# Patient Record
Sex: FEMALE | Race: BLACK OR AFRICAN AMERICAN | Employment: OTHER | URBAN - METROPOLITAN AREA
[De-identification: names, ages, dates, MRNs, and addresses within clinical notes are randomized per-mention and may not be internally consistent; named-entity substitution may affect disease eponyms.]

---

## 2017-01-23 DIAGNOSIS — E11.49 TYPE II DIABETES MELLITUS WITH NEUROLOGICAL MANIFESTATIONS (HCC): ICD-10-CM

## 2017-01-23 RX ORDER — PANTOPRAZOLE SODIUM 40 MG/1
TABLET, DELAYED RELEASE ORAL
Qty: 30 TAB | Refills: 0 | Status: SHIPPED | OUTPATIENT
Start: 2017-01-23 | End: 2017-04-03 | Stop reason: SDUPTHER

## 2017-01-23 RX ORDER — LANCETS
EACH MISCELLANEOUS
Qty: 100 EACH | Refills: 0 | Status: SHIPPED | OUTPATIENT
Start: 2017-01-23 | End: 2017-02-09 | Stop reason: SDUPTHER

## 2017-01-24 ENCOUNTER — TELEPHONE (OUTPATIENT)
Dept: FAMILY MEDICINE CLINIC | Age: 82
End: 2017-01-24

## 2017-01-24 NOTE — TELEPHONE ENCOUNTER
Fax received from [facility] requesting rx \"OneTouch Delica lancets 85W mis,\" directions use one lancet twice daily to test blood sugar. Not seen on med list. Please order.

## 2017-01-27 RX ORDER — PANTOPRAZOLE SODIUM 40 MG/1
TABLET, DELAYED RELEASE ORAL
Qty: 30 TAB | Refills: 0 | Status: SHIPPED | OUTPATIENT
Start: 2017-01-27 | End: 2017-04-03 | Stop reason: SDUPTHER

## 2017-01-30 ENCOUNTER — TELEPHONE (OUTPATIENT)
Dept: FAMILY MEDICINE CLINIC | Age: 82
End: 2017-01-30

## 2017-02-03 NOTE — TELEPHONE ENCOUNTER
Clarification: please re-send meds from 1/27/17 marked other. Pt uses One Touch Ultra and prescriptions need dx codes on script please. Thank you.

## 2017-02-03 NOTE — TELEPHONE ENCOUNTER
[facility] called: they need Dx code for both rxn labeled other  For 1/27/17 (BD single use swab) and pt is using one-touch ultra not True Metrix; please advise  Tel: [phone]  Fax: [phone]

## 2017-02-06 NOTE — TELEPHONE ENCOUNTER
Meds printed and faxed to [facility] @ [phone]. Copy submitted for scanning.

## 2017-02-09 DIAGNOSIS — E11.49 TYPE II DIABETES MELLITUS WITH NEUROLOGICAL MANIFESTATIONS (HCC): ICD-10-CM

## 2017-02-10 ENCOUNTER — TELEPHONE (OUTPATIENT)
Dept: FAMILY MEDICINE CLINIC | Age: 82
End: 2017-02-10

## 2017-02-10 RX ORDER — LANCETS
EACH MISCELLANEOUS
Qty: 100 EACH | Refills: 0 | Status: SHIPPED | OUTPATIENT
Start: 2017-02-10

## 2017-02-28 RX ORDER — ERGOCALCIFEROL 1.25 MG/1
CAPSULE ORAL
Qty: 12 CAP | Refills: 0 | OUTPATIENT
Start: 2017-02-28

## 2017-03-02 RX ORDER — ERGOCALCIFEROL 1.25 MG/1
50000 CAPSULE ORAL
Qty: 12 CAP | Refills: 0 | OUTPATIENT
Start: 2017-03-02

## 2017-03-21 RX ORDER — ERGOCALCIFEROL 1.25 MG/1
CAPSULE ORAL
Qty: 12 CAP | Refills: 0 | OUTPATIENT
Start: 2017-03-21

## 2017-03-23 NOTE — TELEPHONE ENCOUNTER
Pharmacy called during lunch wanting a refill for her vitamin  D2 medication. The nurse were at lunch so I had to take a message. I see Dr Donovan Estrella refused previous requests for medication. What was the reason?

## 2017-03-30 DIAGNOSIS — E11.40 TYPE 2 DIABETES MELLITUS WITH DIABETIC NEUROPATHY (HCC): ICD-10-CM

## 2017-03-31 RX ORDER — METFORMIN HYDROCHLORIDE 500 MG/1
TABLET, EXTENDED RELEASE ORAL
Qty: 60 TAB | Refills: 0 | Status: SHIPPED | OUTPATIENT
Start: 2017-03-31 | End: 2017-05-18 | Stop reason: SDUPTHER

## 2017-04-03 RX ORDER — BLOOD-GLUCOSE METER
1 EACH MISCELLANEOUS 2 TIMES DAILY
Qty: 1 EACH | Refills: 0 | Status: SHIPPED | OUTPATIENT
Start: 2017-04-03 | End: 2018-02-06 | Stop reason: SDUPTHER

## 2017-04-03 RX ORDER — LANCETS
EACH MISCELLANEOUS
Qty: 100 EACH | Refills: 11 | Status: SHIPPED | OUTPATIENT
Start: 2017-04-03 | End: 2017-05-18 | Stop reason: SDUPTHER

## 2017-04-03 RX ORDER — PANTOPRAZOLE SODIUM 40 MG/1
TABLET, DELAYED RELEASE ORAL
Qty: 90 TAB | Refills: 0 | Status: SHIPPED | OUTPATIENT
Start: 2017-04-03 | End: 2017-07-20 | Stop reason: SDUPTHER

## 2017-05-04 ENCOUNTER — TELEPHONE (OUTPATIENT)
Dept: FAMILY MEDICINE CLINIC | Age: 82
End: 2017-05-04

## 2017-05-04 DIAGNOSIS — E78.00 PURE HYPERCHOLESTEROLEMIA: ICD-10-CM

## 2017-05-04 NOTE — TELEPHONE ENCOUNTER
[address] called on the pt's behalf stating the pt does not like the extended release Metformin. They are requesting her prescription be changed to immediate release or liquid.  Please advise

## 2017-05-05 RX ORDER — PRAVASTATIN SODIUM 40 MG/1
40 TABLET ORAL
Qty: 90 TAB | Refills: 1 | OUTPATIENT
Start: 2017-05-05

## 2017-05-18 ENCOUNTER — OFFICE VISIT (OUTPATIENT)
Dept: FAMILY MEDICINE CLINIC | Age: 82
End: 2017-05-18

## 2017-05-18 ENCOUNTER — HOSPITAL ENCOUNTER (OUTPATIENT)
Dept: LAB | Age: 82
Discharge: HOME | End: 2017-05-18

## 2017-05-18 VITALS
HEART RATE: 68 BPM | WEIGHT: 176.2 LBS | HEIGHT: 63 IN | OXYGEN SATURATION: 95 % | TEMPERATURE: 97.7 F | RESPIRATION RATE: 18 BRPM | SYSTOLIC BLOOD PRESSURE: 132 MMHG | BODY MASS INDEX: 31.22 KG/M2 | DIASTOLIC BLOOD PRESSURE: 65 MMHG

## 2017-05-18 DIAGNOSIS — E11.49 TYPE II DIABETES MELLITUS WITH NEUROLOGICAL MANIFESTATIONS (HCC): Primary | ICD-10-CM

## 2017-05-18 DIAGNOSIS — E78.5 OTHER AND UNSPECIFIED HYPERLIPIDEMIA: ICD-10-CM

## 2017-05-18 DIAGNOSIS — M17.12 PRIMARY OSTEOARTHRITIS OF LEFT KNEE: ICD-10-CM

## 2017-05-18 DIAGNOSIS — G62.9 NEUROPATHY: ICD-10-CM

## 2017-05-18 DIAGNOSIS — I10 ESSENTIAL HYPERTENSION: ICD-10-CM

## 2017-05-18 LAB
GLUCOSE POC: 187 MG/DL
HBA1C MFR BLD HPLC: 9 %

## 2017-05-18 PROCEDURE — 99001 SPECIMEN HANDLING PT-LAB: CPT | Performed by: INTERNAL MEDICINE

## 2017-05-18 RX ORDER — LOSARTAN POTASSIUM 100 MG/1
100 TABLET ORAL DAILY
Qty: 90 TAB | Refills: 1 | Status: SHIPPED | OUTPATIENT
Start: 2017-05-18 | End: 2017-10-16 | Stop reason: SDUPTHER

## 2017-05-18 RX ORDER — LANCETS
EACH MISCELLANEOUS
Qty: 100 EACH | Refills: 11 | Status: SHIPPED | OUTPATIENT
Start: 2017-05-18 | End: 2017-06-22 | Stop reason: SDUPTHER

## 2017-05-18 RX ORDER — GABAPENTIN 100 MG/1
200 CAPSULE ORAL
Qty: 180 CAP | Refills: 3 | Status: SHIPPED | OUTPATIENT
Start: 2017-05-18 | End: 2018-05-29 | Stop reason: SDUPTHER

## 2017-05-18 RX ORDER — HYDROCHLOROTHIAZIDE 25 MG/1
25 TABLET ORAL DAILY
Qty: 90 TAB | Refills: 1 | Status: SHIPPED | OUTPATIENT
Start: 2017-05-18 | End: 2018-01-12 | Stop reason: SDUPTHER

## 2017-05-18 RX ORDER — PRAVASTATIN SODIUM 40 MG/1
40 TABLET ORAL
Qty: 90 TAB | Refills: 1 | Status: SHIPPED | OUTPATIENT
Start: 2017-05-18 | End: 2017-10-16 | Stop reason: SDUPTHER

## 2017-05-18 RX ORDER — PIOGLITAZONEHYDROCHLORIDE 45 MG/1
TABLET ORAL
Qty: 90 TAB | Refills: 1 | Status: SHIPPED | OUTPATIENT
Start: 2017-05-18 | End: 2017-10-16 | Stop reason: SDUPTHER

## 2017-05-18 RX ORDER — METFORMIN HYDROCHLORIDE 500 MG/1
1000 TABLET, EXTENDED RELEASE ORAL 2 TIMES DAILY WITH MEALS
Qty: 120 TAB | Refills: 3 | Status: SHIPPED | OUTPATIENT
Start: 2017-05-18 | End: 2017-10-16 | Stop reason: SDUPTHER

## 2017-05-18 NOTE — PROGRESS NOTES
HISTORY OF PRESENT ILLNESS  Daniela John is a 80 y.o. female. 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 Respiratory: Negative for shortness of breath. Cardiovascular: Negative for chest pain, palpitations and leg swelling. Gastrointestinal: Negative for nausea. Musculoskeletal: Negative for falls and myalgias. Neurological: Negative for dizziness and headaches. Physical Exam   Constitutional: She is oriented to person, place, and time. She appears well-developed and well-nourished. Neck: Neck supple. No thyromegaly present. Cardiovascular: Normal rate, regular rhythm and normal heart sounds. No murmur heard. Pulmonary/Chest: Effort normal and breath sounds normal. She has no rales. Abdominal: Soft. Bowel sounds are normal. There is no tenderness. Musculoskeletal: She exhibits no edema. Left knee: She exhibits normal range of motion, no swelling, no ecchymosis, no erythema and normal meniscus. Tenderness (diffuse, crepitus with ROM) found. Neurological: She is alert and oriented to person, place, and time. Gait (use cane) abnormal.   Skin:   Feet: decreased sensation to microfilament, no rash, no calluses, no ulcers     Vitals reviewed. ASSESSMENT and PLAN  Jennifer Armando was seen today for hypertension, cholesterol problem and diabetes.     Diagnoses and all orders for this visit:    Type II diabetes mellitus with neurological manifestations ([address] restart metformin, increase lantus to 20 units, call in a week if her fasting glucose more than 130  -     AMB POC HEMOGLOBIN A1C  -     AMB POC GLUCOSE BLOOD, BY GLUCOSE MONITORING DEVICE  -     pioglitazone (ACTOS) 45 mg tablet; TAKE ONE TABLET BY MOUTH EVERY DAY  -     metFORMIN ER (GLUCOPHAGE XR) 500 mg tablet; Take 2 Tabs by mouth two (2) times daily (with meals). -     glucose blood VI test strips (ACCU-CHEK GUILLAUME PLUS TEST STRP) strip; Check BS 3 daily Dx. E11.49  -     Lancets (ACCU-CHEK SOFTCLIX LANCETS) misc; Check BS 3 daily Dx. E11.49  -     MICROALBUMIN, UR, RAND W/ MICROALBUMIN/CREA RATIO; Future  -     CBC WITH AUTOMATED DIFF; Future  -     LIPID PANEL; Future  -     METABOLIC PANEL, COMPREHENSIVE; Future  -     URINALYSIS W/ RFLX MICROSCOPIC; Future    Essential hypertension, stable  -     hydroCHLOROthiazide (HYDRODIURIL) 25 mg tablet; Take 1 Tab by mouth daily. -     losartan (COZAAR) 100 mg tablet; Take 1 Tab by mouth daily.  -     CBC WITH AUTOMATED DIFF; Future  -     LIPID PANEL; Future  -     METABOLIC PANEL, COMPREHENSIVE; Future  -     URINALYSIS W/ RFLX MICROSCOPIC; Future    Other and unspecified hyperlipidemia, stable  -     pravastatin (PRAVACHOL) 40 mg tablet; Take 1 Tab by mouth nightly. -     CBC WITH AUTOMATED DIFF; Future  -     LIPID PANEL; Future  -     METABOLIC PANEL, COMPREHENSIVE; Future    Neuropathy, stable  -     gabapentin (NEURONTIN) 100 mg capsule; Take 2 Caps by mouth three (3) times daily as needed. Indications: NEUROPATHIC PAIN    Primary osteoarthritis of left knee, discussed options, she wanted steroid injection  -     METHYLPREDNISOLONE ACETATE INJECTION 80 MG  -     DRAIN/INJECT LARGE JOINT/BURSA    After obtaining written consent, using sterile fashion, the left knee was injected with 80 mg depomedrol mixed with 2 cc lidocaine . Pt tolerated the procedure well. After care instructions given.  Pain level improved to 2/10 after the injection    [facility]  OFFICE PROCEDURE PROGRESS NOTE        Chart reviewed for the following:   Iris RUTHEROFRD, MD, have reviewed the History, Physical and updated the Allergic reactions for [id number] performed immediately prior to start of procedure:   Neo Salamanca MD, have performed the following reviews on [place] Wick prior to the start of the procedure:            * Patient was identified by name and date of birth   * Agreement on procedure being performed was verified  * Risks and Benefits explained to the patient  * Procedure site verified and marked as necessary  * Patient was positioned for comfort  * Consent was signed and verified     Date of procedure:5/18/2017   Time of procedure: 11:21 am  Procedure performed by:  Jadiel Segura MD    Provider assisted by: Pattie Amaya    Patient assisted by: self    Pt tolerated the procedure well with no complications      Pt was advised to contact our office immediately if redness/pain or fever develops, Pt verbalized understanding      Results for orders placed or performed in visit on 05/18/17   AMB POC HEMOGLOBIN A1C   Result Value Ref Range    Hemoglobin A1c (POC) 9.0 %   AMB POC GLUCOSE BLOOD, BY GLUCOSE MONITORING DEVICE   Result Value Ref Range    Glucose  mg/dL     rtc 3 mos, sooner if needed

## 2017-05-18 NOTE — PROGRESS NOTES
1. Have you been to the ER, urgent care clinic since your last visit? Hospitalized since your last visit? No    2. Have you seen or consulted any other health care providers outside of the [address] [place] [address] since your last visit? Include any pap smears or colon screening.  Yes When: 3/2017 Dr. Mona Mays

## 2017-05-19 LAB
ALBUMIN SERPL-MCNC: 4.1 G/DL (ref 3.5–4.7)
ALBUMIN/CREAT UR: 18.1 MG/G CREAT (ref 0–30)
ALBUMIN/GLOB SERPL: 1.3 {RATIO} (ref 1.2–2.2)
ALP SERPL-CCNC: 78 IU/L (ref 39–117)
ALT SERPL-CCNC: 22 IU/L (ref 0–32)
APPEARANCE UR: ABNORMAL
AST SERPL-CCNC: 22 IU/L (ref 0–40)
BACTERIA #/AREA URNS HPF: ABNORMAL /[HPF]
BASOPHILS # BLD AUTO: 0 X10E3/UL (ref 0–0.2)
BASOPHILS NFR BLD AUTO: 0 %
BILIRUB SERPL-MCNC: 0.5 MG/DL (ref 0–1.2)
BILIRUB UR QL STRIP: NEGATIVE
BUN SERPL-MCNC: 22 MG/DL (ref 8–27)
BUN/CREAT SERPL: 24 (ref 12–28)
CALCIUM SERPL-MCNC: 9.6 MG/DL (ref 8.7–10.3)
CASTS URNS QL MICRO: ABNORMAL /LPF
CHLORIDE SERPL-SCNC: 95 MMOL/L (ref 96–106)
CHOLEST SERPL-MCNC: 165 MG/DL (ref 100–199)
CO2 SERPL-SCNC: 26 MMOL/L (ref 18–29)
COLOR UR: YELLOW
CREAT SERPL-MCNC: 0.92 MG/DL (ref 0.57–1)
CREAT UR-MCNC: 107.4 MG/DL
EOSINOPHIL # BLD AUTO: 0 X10E3/UL (ref 0–0.4)
EOSINOPHIL NFR BLD AUTO: 0 %
EPI CELLS #/AREA URNS HPF: ABNORMAL /HPF
ERYTHROCYTE [DISTWIDTH] IN BLOOD BY AUTOMATED COUNT: 13.5 % (ref 12.3–15.4)
GLOBULIN SER CALC-MCNC: 3.2 G/DL (ref 1.5–4.5)
GLUCOSE SERPL-MCNC: 175 MG/DL (ref 65–99)
GLUCOSE UR QL: NEGATIVE
HCT VFR BLD AUTO: 42.3 % (ref 34–46.6)
HDLC SERPL-MCNC: 46 MG/DL
HGB BLD-MCNC: 13.4 G/DL (ref 11.1–15.9)
HGB UR QL STRIP: ABNORMAL
IMM GRANULOCYTES # BLD: 0 X10E3/UL (ref 0–0.1)
IMM GRANULOCYTES NFR BLD: 0 %
INTERPRETATION, 910389: NORMAL
INTERPRETATION: NORMAL
KETONES UR QL STRIP: NEGATIVE
LDLC SERPL CALC-MCNC: 99 MG/DL (ref 0–99)
LEUKOCYTE ESTERASE UR QL STRIP: NEGATIVE
LYMPHOCYTES # BLD AUTO: 1.2 X10E3/UL (ref 0.7–3.1)
LYMPHOCYTES NFR BLD AUTO: 24 %
MCH RBC QN AUTO: 26.8 PG (ref 26.6–33)
MCHC RBC AUTO-ENTMCNC: 31.7 G/DL (ref 31.5–35.7)
MCV RBC AUTO: 85 FL (ref 79–97)
MICRO URNS: ABNORMAL
MICROALBUMIN UR-MCNC: 19.4 UG/ML
MONOCYTES # BLD AUTO: 0.6 X10E3/UL (ref 0.1–0.9)
MONOCYTES NFR BLD AUTO: 12 %
MUCOUS THREADS URNS QL MICRO: PRESENT
NEUTROPHILS # BLD AUTO: 3.1 X10E3/UL (ref 1.4–7)
NEUTROPHILS NFR BLD AUTO: 64 %
NITRITE UR QL STRIP: POSITIVE
PDF IMAGE, 910387: NORMAL
PH UR STRIP: 5 [PH] (ref 5–7.5)
PLATELET # BLD AUTO: 209 X10E3/UL (ref 150–379)
POTASSIUM SERPL-SCNC: 3.8 MMOL/L (ref 3.5–5.2)
PROT SERPL-MCNC: 7.3 G/DL (ref 6–8.5)
PROT UR QL STRIP: NEGATIVE
RBC # BLD AUTO: 5 X10E6/UL (ref 3.77–5.28)
RBC #/AREA URNS HPF: ABNORMAL /HPF
SODIUM SERPL-SCNC: 140 MMOL/L (ref 134–144)
SP GR UR: 1.02 (ref 1–1.03)
TRIGL SERPL-MCNC: 99 MG/DL (ref 0–149)
UROBILINOGEN UR STRIP-MCNC: 0.2 MG/DL (ref 0.2–1)
VLDLC SERPL CALC-MCNC: 20 MG/DL (ref 5–40)
WBC # BLD AUTO: 4.8 X10E3/UL (ref 3.4–10.8)
WBC #/AREA URNS HPF: ABNORMAL /HPF

## 2017-05-22 RX ORDER — SULFAMETHOXAZOLE AND TRIMETHOPRIM 800; 160 MG/1; MG/1
1 TABLET ORAL 2 TIMES DAILY
Qty: 14 TAB | Refills: 0 | Status: SHIPPED | OUTPATIENT
Start: 2017-05-22 | End: 2017-05-29

## 2017-05-22 NOTE — PROGRESS NOTES
Lipids are good  Glucose 175, similar to her glucose reading in the office    UA showed positive UTI, Abx called to her pharmacy, need to follow up next week to re check

## 2017-06-22 DIAGNOSIS — E11.49 TYPE II DIABETES MELLITUS WITH NEUROLOGICAL MANIFESTATIONS (HCC): ICD-10-CM

## 2017-06-22 RX ORDER — LANCETS
EACH MISCELLANEOUS
Qty: 100 EACH | Refills: 11 | Status: SHIPPED | OUTPATIENT
Start: 2017-06-22 | End: 2019-02-19 | Stop reason: SDUPTHER

## 2017-06-23 ENCOUNTER — TELEPHONE (OUTPATIENT)
Dept: FAMILY MEDICINE CLINIC | Age: 82
End: 2017-06-23

## 2017-06-23 NOTE — TELEPHONE ENCOUNTER
[address] called in regards to the pt, her test strips are coming up too soon and it's because the current script says to check 3 times a day and the pt checks it 5 times a day. They need a new script to say 5 times a day instead of 3 times a day. Please make sure the diagnoses code is attached.

## 2017-06-26 NOTE — TELEPHONE ENCOUNTER
No need to check more that 3 x daily, she need to pay for the extra strips if she wants to monitor 5xdaily

## 2017-07-14 DIAGNOSIS — E11.49 TYPE II DIABETES MELLITUS WITH NEUROLOGICAL MANIFESTATIONS (HCC): ICD-10-CM

## 2017-07-14 RX ORDER — INSULIN GLARGINE 100 [IU]/ML
INJECTION, SOLUTION SUBCUTANEOUS
Qty: 15 PEN | Refills: 0 | Status: SHIPPED | OUTPATIENT
Start: 2017-07-14 | End: 2018-01-12 | Stop reason: SDUPTHER

## 2017-07-14 NOTE — TELEPHONE ENCOUNTER
Pt needing test strips. Trying to request enough to test 5 times daily as the pt does additional testing unpromptedly. States they need it today, please expedite if possible.

## 2017-07-21 RX ORDER — PANTOPRAZOLE SODIUM 40 MG/1
TABLET, DELAYED RELEASE ORAL
Qty: 90 TAB | Refills: 1 | Status: SHIPPED | OUTPATIENT
Start: 2017-07-21 | End: 2018-02-01 | Stop reason: SDUPTHER

## 2017-08-31 ENCOUNTER — CLINICAL SUPPORT (OUTPATIENT)
Dept: FAMILY MEDICINE CLINIC | Age: 82
End: 2017-08-31

## 2017-10-16 ENCOUNTER — HOSPITAL ENCOUNTER (OUTPATIENT)
Dept: LAB | Age: 82
Discharge: HOME | End: 2017-10-16
Payer: MEDICARE

## 2017-10-16 ENCOUNTER — TELEPHONE (OUTPATIENT)
Dept: FAMILY MEDICINE CLINIC | Age: 82
End: 2017-10-16

## 2017-10-16 ENCOUNTER — OFFICE VISIT (OUTPATIENT)
Dept: FAMILY MEDICINE CLINIC | Age: 82
End: 2017-10-16

## 2017-10-16 ENCOUNTER — HOSPITAL ENCOUNTER (OUTPATIENT)
Dept: LAB | Age: 82
Discharge: HOME | End: 2017-10-16

## 2017-10-16 VITALS
BODY MASS INDEX: 31.18 KG/M2 | SYSTOLIC BLOOD PRESSURE: 140 MMHG | OXYGEN SATURATION: 98 % | DIASTOLIC BLOOD PRESSURE: 70 MMHG | WEIGHT: 176 LBS | HEART RATE: 85 BPM | HEIGHT: 63 IN | TEMPERATURE: 98.8 F | RESPIRATION RATE: 20 BRPM

## 2017-10-16 DIAGNOSIS — E78.2 MIXED HYPERLIPIDEMIA: ICD-10-CM

## 2017-10-16 DIAGNOSIS — E11.49 TYPE II DIABETES MELLITUS WITH NEUROLOGICAL MANIFESTATIONS (HCC): ICD-10-CM

## 2017-10-16 DIAGNOSIS — I10 ESSENTIAL HYPERTENSION: ICD-10-CM

## 2017-10-16 DIAGNOSIS — E11.49 TYPE II DIABETES MELLITUS WITH NEUROLOGICAL MANIFESTATIONS (HCC): Primary | ICD-10-CM

## 2017-10-16 LAB
APPEARANCE UR: CLEAR
BACTERIA URNS QL MICRO: ABNORMAL /HPF
BILIRUB UR QL: NEGATIVE
COLOR UR: YELLOW
EPITH CASTS URNS QL MICRO: ABNORMAL /LPF (ref 0–5)
GLUCOSE UR STRIP.AUTO-MCNC: NEGATIVE MG/DL
HBA1C MFR BLD HPLC: 6.9 %
HGB UR QL STRIP: NEGATIVE
KETONES UR QL STRIP.AUTO: NEGATIVE MG/DL
LEUKOCYTE ESTERASE UR QL STRIP.AUTO: ABNORMAL
NITRITE UR QL STRIP.AUTO: NEGATIVE
PH UR STRIP: 6 [PH] (ref 5–8)
PROT UR STRIP-MCNC: NEGATIVE MG/DL
RBC #/AREA URNS HPF: NEGATIVE /HPF (ref 0–5)
SP GR UR REFRACTOMETRY: 1.01 (ref 1–1.03)
UROBILINOGEN UR QL STRIP.AUTO: 0.2 EU/DL (ref 0.2–1)
WBC URNS QL MICRO: ABNORMAL /HPF (ref 0–4)

## 2017-10-16 PROCEDURE — 99001 SPECIMEN HANDLING PT-LAB: CPT | Performed by: INTERNAL MEDICINE

## 2017-10-16 PROCEDURE — 81001 URINALYSIS AUTO W/SCOPE: CPT | Performed by: INTERNAL MEDICINE

## 2017-10-16 RX ORDER — PIOGLITAZONEHYDROCHLORIDE 45 MG/1
TABLET ORAL
Qty: 90 TAB | Refills: 1 | Status: SHIPPED | OUTPATIENT
Start: 2017-10-16 | End: 2018-09-25 | Stop reason: SDUPTHER

## 2017-10-16 RX ORDER — LOSARTAN POTASSIUM 100 MG/1
100 TABLET ORAL DAILY
Qty: 90 TAB | Refills: 1 | Status: SHIPPED | OUTPATIENT
Start: 2017-10-16 | End: 2018-07-17 | Stop reason: SDUPTHER

## 2017-10-16 RX ORDER — METFORMIN HYDROCHLORIDE 500 MG/1
1000 TABLET, EXTENDED RELEASE ORAL 2 TIMES DAILY WITH MEALS
Qty: 120 TAB | Refills: 3 | Status: SHIPPED | OUTPATIENT
Start: 2017-10-16 | End: 2018-07-17 | Stop reason: SDUPTHER

## 2017-10-16 RX ORDER — PRAVASTATIN SODIUM 40 MG/1
40 TABLET ORAL
Qty: 90 TAB | Refills: 1 | Status: SHIPPED | OUTPATIENT
Start: 2017-10-16 | End: 2018-09-25 | Stop reason: SDUPTHER

## 2017-10-16 NOTE — PROGRESS NOTES
HISTORY OF PRESENT ILLNESS  Tanner Garcia is a 80 y.o. female. HPI  DM, with neuropathy, stable, her a1c is 6.9 today, glucose  in am, well controlled, her neuropathy is controlled on neurontin prn  HTN, stable, bp is well controlled on meds daily  HLD, stable, tolerating pravachol well  Review of Systems   Constitutional: Negative for chills and fever. Respiratory: Negative for shortness of breath. Cardiovascular: Negative for chest pain, palpitations and leg swelling. Gastrointestinal: Negative for nausea. Musculoskeletal: Negative for falls and myalgias. Neurological: Negative for dizziness and headaches. Physical Exam   Constitutional: She is oriented to person, place, and time. She appears well-developed and well-nourished. Cardiovascular: Normal rate, regular rhythm and normal heart sounds. No murmur heard. Pulmonary/Chest: Effort normal and breath sounds normal. She has no rales. Abdominal: Soft. There is no tenderness. Musculoskeletal: She exhibits no edema. Neurological: She is alert and oriented to person, place, and time. Vitals reviewed. ASSESSMENT and PLAN  Diagnoses and all orders for this visit:    1. Type II diabetes mellitus with neurological manifestations ([facility] [address] stable, she will get her diabetic eye exam done this month  -     AMB POC HEMOGLOBIN A1C  -     pioglitazone (ACTOS) 45 mg tablet; TAKE ONE TABLET BY MOUTH EVERY DAY  -     metFORMIN ER (GLUCOPHAGE XR) 500 mg tablet; Take 2 Tabs by mouth two (2) times daily (with meals). -     CBC WITH AUTOMATED DIFF; Future  -     METABOLIC PANEL, COMPREHENSIVE; Future  -     URINALYSIS W/ RFLX MICROSCOPIC; Future    2. Essential hypertension, stable  -     losartan (COZAAR) 100 mg tablet; Take 1 Tab by mouth daily.  -     CBC WITH AUTOMATED DIFF; Future  -     METABOLIC PANEL, COMPREHENSIVE; Future    3. Mixed hyperlipidemia, stable  -     pravastatin (PRAVACHOL) 40 mg tablet; Take 1 Tab by mouth nightly.   - CBC WITH AUTOMATED DIFF; Future  -     METABOLIC PANEL, COMPREHENSIVE; Future    Results for orders placed or performed in visit on 10/16/17   AMB POC HEMOGLOBIN A1C   Result Value Ref Range    Hemoglobin A1c (POC) 6.9 %     Lab Results   Component Value Date/Time    Sodium 140 05/18/2017 11:27 AM    Potassium 3.8 05/18/2017 11:27 AM    Chloride 95 05/18/2017 11:27 AM    CO2 26 05/18/2017 11:27 AM    Anion gap 11 09/09/2010 10:12 AM    Glucose 175 05/18/2017 11:27 AM    BUN 22 05/18/2017 11:27 AM    Creatinine 0.92 05/18/2017 11:27 AM    BUN/Creatinine ratio 24 05/18/2017 11:27 AM    GFR est AA 65 05/18/2017 11:27 AM    GFR est non-AA 56 05/18/2017 11:27 AM    Calcium 9.6 05/18/2017 11:27 AM    Bilirubin, total 0.5 05/18/2017 11:27 AM    AST (SGOT) 22 05/18/2017 11:27 AM    Alk.  phosphatase 78 05/18/2017 11:27 AM    Protein, total 7.3 05/18/2017 11:27 AM    Albumin 4.1 05/18/2017 11:27 AM    Globulin 3.6 01/12/2010 09:14 AM    A-G Ratio 1.3 05/18/2017 11:27 AM    ALT (SGPT) 22 05/18/2017 11:27 AM     Lab Results   Component Value Date/Time    Cholesterol, total 165 05/18/2017 11:27 AM    HDL Cholesterol 46 05/18/2017 11:27 AM    LDL, calculated 99 05/18/2017 11:27 AM    VLDL, calculated 20 05/18/2017 11:27 AM    Triglyceride 99 05/18/2017 11:27 AM    CHOL/HDL Ratio 4.4 01/12/2010 09:14 AM   rtc 3-4 mos

## 2017-10-16 NOTE — TELEPHONE ENCOUNTER
Pt's daughter calling and requesting us to send A1C to ortho. She was unsure of who to send it to so she will call back with the information.

## 2017-10-16 NOTE — PROGRESS NOTES
Laly Fajardo is a 80 y.o. female (: 1929) presenting to address:    Chief Complaint   Patient presents with    Diabetes       Vitals:    10/16/17 1052   BP: 140/70   Pulse: 85   Resp: 20   Temp: 98.8 °F (37.1 °C)   TempSrc: Oral   SpO2: 98%   Weight: 176 lb (79.8 kg)   Height: 5' 3\" (1.6 m)   PainSc:   0 - No pain       Hearing/Vision:   No exam data present    Learning Assessment:     Learning Assessment 2014   PRIMARY LEARNER Patient   PRIMARY LANGUAGE ENGLISH   LEARNER PREFERENCE PRIMARY OTHER (COMMENT)   ANSWERED BY patient   RELATIONSHIP SELF     Depression Screening:     PHQ over the last two weeks 10/16/2017   Little interest or pleasure in doing things Not at all   Feeling down, depressed or hopeless Not at all   Total Score PHQ 2 0     Fall Risk Assessment:     Fall Risk Assessment, last 12 mths 10/16/2017   Able to walk? Yes   Fall in past 12 months? No   Fall with injury? -   Number of falls in past 12 months -     Abuse Screening:     Abuse Screening Questionnaire 10/16/2017   Do you ever feel afraid of your partner? N   Are you in a relationship with someone who physically or mentally threatens you? N   Is it safe for you to go home? Y     Coordination of Care Questionaire:   1. Have you been to the ER, urgent care clinic since your last visit? Hospitalized since your last visit? NO    2. Have you seen or consulted any other health care providers outside of the [address] [place] [address] since your last visit? Include any pap smears or colon screening. NO    Advanced Directive:   1. Do you have an Advanced Directive? NO    2. Would you like information on Advanced Directives?  Yes

## 2017-10-17 LAB
ALBUMIN SERPL-MCNC: 4.1 G/DL (ref 3.5–4.7)
ALBUMIN/GLOB SERPL: 1.3 {RATIO} (ref 1.2–2.2)
ALP SERPL-CCNC: 64 IU/L (ref 39–117)
ALT SERPL-CCNC: 15 IU/L (ref 0–32)
AST SERPL-CCNC: 23 IU/L (ref 0–40)
BASOPHILS # BLD AUTO: 0 X10E3/UL (ref 0–0.2)
BASOPHILS NFR BLD AUTO: 0 %
BILIRUB SERPL-MCNC: 0.4 MG/DL (ref 0–1.2)
BUN SERPL-MCNC: 22 MG/DL (ref 8–27)
BUN/CREAT SERPL: 23 (ref 12–28)
CALCIUM SERPL-MCNC: 9.8 MG/DL (ref 8.7–10.3)
CHLORIDE SERPL-SCNC: 96 MMOL/L (ref 96–106)
CO2 SERPL-SCNC: 28 MMOL/L (ref 18–29)
CREAT SERPL-MCNC: 0.94 MG/DL (ref 0.57–1)
EOSINOPHIL # BLD AUTO: 0 X10E3/UL (ref 0–0.4)
EOSINOPHIL NFR BLD AUTO: 1 %
ERYTHROCYTE [DISTWIDTH] IN BLOOD BY AUTOMATED COUNT: 13.8 % (ref 12.3–15.4)
GLOBULIN SER CALC-MCNC: 3.2 G/DL (ref 1.5–4.5)
GLUCOSE SERPL-MCNC: 91 MG/DL (ref 65–99)
HCT VFR BLD AUTO: 40.4 % (ref 34–46.6)
HGB BLD-MCNC: 12.4 G/DL (ref 11.1–15.9)
IMM GRANULOCYTES # BLD: 0 X10E3/UL (ref 0–0.1)
IMM GRANULOCYTES NFR BLD: 0 %
INTERPRETATION: NORMAL
LYMPHOCYTES # BLD AUTO: 1.1 X10E3/UL (ref 0.7–3.1)
LYMPHOCYTES NFR BLD AUTO: 24 %
MCH RBC QN AUTO: 26.7 PG (ref 26.6–33)
MCHC RBC AUTO-ENTMCNC: 30.7 G/DL (ref 31.5–35.7)
MCV RBC AUTO: 87 FL (ref 79–97)
MONOCYTES # BLD AUTO: 0.5 X10E3/UL (ref 0.1–0.9)
MONOCYTES NFR BLD AUTO: 11 %
NEUTROPHILS # BLD AUTO: 2.8 X10E3/UL (ref 1.4–7)
NEUTROPHILS NFR BLD AUTO: 64 %
PLATELET # BLD AUTO: 201 X10E3/UL (ref 150–379)
POTASSIUM SERPL-SCNC: 3.8 MMOL/L (ref 3.5–5.2)
PROT SERPL-MCNC: 7.3 G/DL (ref 6–8.5)
RBC # BLD AUTO: 4.64 X10E6/UL (ref 3.77–5.28)
SODIUM SERPL-SCNC: 140 MMOL/L (ref 134–144)
WBC # BLD AUTO: 4.4 X10E3/UL (ref 3.4–10.8)

## 2017-10-17 RX ORDER — NITROFURANTOIN 25; 75 MG/1; MG/1
100 CAPSULE ORAL 2 TIMES DAILY
Qty: 14 CAP | Refills: 0 | Status: SHIPPED | OUTPATIENT
Start: 2017-10-17 | End: 2018-01-12

## 2018-01-11 LAB — HBA1C MFR BLD HPLC: 7.5 %

## 2018-01-12 ENCOUNTER — OFFICE VISIT (OUTPATIENT)
Dept: FAMILY MEDICINE CLINIC | Age: 83
End: 2018-01-12

## 2018-01-12 VITALS
WEIGHT: 174.3 LBS | HEART RATE: 68 BPM | HEIGHT: 63 IN | TEMPERATURE: 95 F | DIASTOLIC BLOOD PRESSURE: 52 MMHG | OXYGEN SATURATION: 98 % | BODY MASS INDEX: 30.88 KG/M2 | SYSTOLIC BLOOD PRESSURE: 140 MMHG | RESPIRATION RATE: 20 BRPM

## 2018-01-12 DIAGNOSIS — E11.49 TYPE II DIABETES MELLITUS WITH NEUROLOGICAL MANIFESTATIONS (HCC): Primary | ICD-10-CM

## 2018-01-12 DIAGNOSIS — E78.2 MIXED HYPERLIPIDEMIA: ICD-10-CM

## 2018-01-12 DIAGNOSIS — I10 ESSENTIAL HYPERTENSION: ICD-10-CM

## 2018-01-12 RX ORDER — INSULIN GLARGINE 100 [IU]/ML
INJECTION, SOLUTION SUBCUTANEOUS
Qty: 15 PEN | Refills: 3 | Status: SHIPPED | OUTPATIENT
Start: 2018-01-12 | End: 2019-02-08 | Stop reason: SDUPTHER

## 2018-01-12 RX ORDER — HYDROCHLOROTHIAZIDE 25 MG/1
25 TABLET ORAL DAILY
Qty: 90 TAB | Refills: 1 | Status: SHIPPED | OUTPATIENT
Start: 2018-01-12 | End: 2018-11-09 | Stop reason: SDUPTHER

## 2018-01-12 RX ORDER — BLOOD SUGAR DIAGNOSTIC
1 STRIP MISCELLANEOUS DAILY
Qty: 100 PEN NEEDLE | Refills: 3 | Status: SHIPPED | OUTPATIENT
Start: 2018-01-12

## 2018-01-12 NOTE — PROGRESS NOTES
HISTORY OF PRESENT ILLNESS  Katelyn Tavera is a 80 y.o. female. HPI  DM with neuropathy, stable, her A1c was 7.5 yesterday, done at the hospital with her pre op labs, glucose  in am, no nhypoglycemia  HTN, stable, bp is controlled on meds daily  HLD, stable, last ldl was 99, on pravachol daily  For right wrist mass surgery next week  Review of Systems   Constitutional: Negative for chills and fever. Respiratory: Negative for shortness of breath. Cardiovascular: Negative for chest pain, palpitations and leg swelling. Gastrointestinal: Negative for nausea. Musculoskeletal: Negative for falls and myalgias. Neurological: Negative for dizziness and headaches. Physical Exam   Constitutional: She is oriented to person, place, and time. She appears well-developed and well-nourished. Cardiovascular: Normal rate, regular rhythm and normal heart sounds. No murmur heard. Pulmonary/Chest: Effort normal and breath sounds normal. She has no rales. Abdominal: Soft. There is no tenderness. Musculoskeletal: She exhibits no edema. Neurological: She is alert and oriented to person, place, and time. Vitals reviewed. ASSESSMENT and PLAN  Diagnoses and all orders for this visit:    1. Type II diabetes mellitus with neurological manifestations ([address] stable  -     insulin glargine (LANTUS SOLOSTAR) 100 unit/mL (3 mL) inpn; INJECT 18 UNITS SUBCUTANEOUSLY ONCE DAILY AT BEDTIME . EACH  PEN  EXPIRES  28  DAYS  AFTER  FIRST  USE.  -     Insulin Needles, Disposable, 32 gauge x 1/4\" ndle; 1 Each by Does Not Apply route daily. Use with Lantus solostar insulin pen once daily    2. Essential hypertension, stable  -     hydroCHLOROthiazide (HYDRODIURIL) 25 mg tablet; Take 1 Tab by mouth daily.     3. Mixed hyperlipidemia, stable    Continue meds  Stable for surgery, hold Actos and metformin on the morning of surgery, may take her cozaar/hctz on the morning of surgery    rtc 4 mos    Lab Results   Component Value Date/Time    Cholesterol, total 165 05/18/2017 11:27 AM    HDL Cholesterol 46 05/18/2017 11:27 AM    LDL, calculated 99 05/18/2017 11:27 AM    VLDL, calculated 20 05/18/2017 11:27 AM    Triglyceride 99 05/18/2017 11:27 AM    CHOL/HDL Ratio 4.4 01/12/2010 09:14 AM     Lab Results   Component Value Date/Time    Cholesterol, total 165 05/18/2017 11:27 AM    HDL Cholesterol 46 05/18/2017 11:27 AM    LDL, calculated 99 05/18/2017 11:27 AM    VLDL, calculated 20 05/18/2017 11:27 AM    Triglyceride 99 05/18/2017 11:27 AM    CHOL/HDL Ratio 4.4 01/12/2010 09:14 AM     Lab Results   Component Value Date/Time    Sodium 140 10/16/2017 11:33 AM    Potassium 3.8 10/16/2017 11:33 AM    Chloride 96 10/16/2017 11:33 AM    CO2 28 10/16/2017 11:33 AM    Anion gap 11 09/09/2010 10:12 AM    Glucose 91 10/16/2017 11:33 AM    BUN 22 10/16/2017 11:33 AM    Creatinine 0.94 10/16/2017 11:33 AM    BUN/Creatinine ratio 23 10/16/2017 11:33 AM    GFR est AA 63 10/16/2017 11:33 AM    GFR est non-AA 54 10/16/2017 11:33 AM    Calcium 9.8 10/16/2017 11:33 AM    Bilirubin, total 0.4 10/16/2017 11:33 AM    AST (SGOT) 23 10/16/2017 11:33 AM    Alk.  phosphatase 64 10/16/2017 11:33 AM    Protein, total 7.3 10/16/2017 11:33 AM    Albumin 4.1 10/16/2017 11:33 AM    Globulin 3.6 01/12/2010 09:14 AM    A-G Ratio 1.3 10/16/2017 11:33 AM    ALT (SGPT) 15 10/16/2017 11:33 AM

## 2018-01-12 NOTE — PROGRESS NOTES
Tanner Neri is a 80 y.o. female (: 1929) presenting to address:    Chief Complaint   Patient presents with    Pre-op Exam       Vitals:    18 1129   BP: 140/52   Pulse: 68   Resp: 20   Temp: 95 °F (35 °C)   TempSrc: Oral   SpO2: 98%   Weight: 174 lb 4.8 oz (79.1 kg)   Height: 5' 3\" (1.6 m)   PainSc:   0 - No pain       Hearing/Vision:   No exam data present    Learning Assessment:     Learning Assessment 2014   PRIMARY LEARNER Patient   PRIMARY LANGUAGE ENGLISH   LEARNER PREFERENCE PRIMARY OTHER (COMMENT)   ANSWERED BY patient   RELATIONSHIP SELF     Depression Screening:     PHQ over the last two weeks 2018   Little interest or pleasure in doing things Not at all   Feeling down, depressed or hopeless Not at all   Total Score PHQ 2 0     Fall Risk Assessment:     Fall Risk Assessment, last 12 mths 2018   Able to walk? Yes   Fall in past 12 months? Yes   Fall with injury? No   Number of falls in past 12 months 1   Fall Risk Score 1     Abuse Screening:     Abuse Screening Questionnaire 10/16/2017   Do you ever feel afraid of your partner? N   Are you in a relationship with someone who physically or mentally threatens you? N   Is it safe for you to go home? Y     Coordination of Care Questionaire:   1. Have you been to the ER, urgent care clinic since your last visit? Hospitalized since your last visit? YES [facility] er    2. Have you seen or consulted any other health care providers outside of the [address] [place] [address] since your last visit? Include any pap smears or colon screening. YES ortho    Advanced Directive:   1. Do you have an Advanced Directive? NO    2. Would you like information on Advanced Directives?  YES

## 2018-02-03 RX ORDER — PANTOPRAZOLE SODIUM 40 MG/1
TABLET, DELAYED RELEASE ORAL
Qty: 90 TAB | Refills: 1 | Status: SHIPPED | OUTPATIENT
Start: 2018-02-03

## 2018-02-06 DIAGNOSIS — E11.49 TYPE II DIABETES MELLITUS WITH NEUROLOGICAL MANIFESTATIONS (HCC): ICD-10-CM

## 2018-02-06 RX ORDER — BLOOD-GLUCOSE METER
1 EACH MISCELLANEOUS 2 TIMES DAILY
Qty: 1 EACH | Refills: 0 | Status: SHIPPED | OUTPATIENT
Start: 2018-02-06

## 2018-02-06 NOTE — TELEPHONE ENCOUNTER
Pt's machine broke and would like a replacement sent to the pharmacy. Has been unable to check today as machine broke.

## 2018-03-06 ENCOUNTER — TELEPHONE (OUTPATIENT)
Dept: FAMILY MEDICINE CLINIC | Age: 83
End: 2018-03-06

## 2018-03-06 NOTE — TELEPHONE ENCOUNTER
Which ER??, What diagnosis ??, I need ER notes, I do not see any info in care everywhere??, we might be able to  place a referral if we have the ER records/diagnosis
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
## 2018-03-07 NOTE — TELEPHONE ENCOUNTER
Attempted to contact pt but the voicemail is not the pts name, it says \"Ms Herbert\", also attempted to contact pts daughter but it was a wrong number. PSR: please update pts info/number when they call back.      Thank you
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
## 2018-03-16 ENCOUNTER — OFFICE VISIT (OUTPATIENT)
Dept: FAMILY MEDICINE CLINIC | Age: 83
End: 2018-03-16

## 2018-03-16 VITALS
WEIGHT: 170 LBS | RESPIRATION RATE: 20 BRPM | HEIGHT: 63 IN | BODY MASS INDEX: 30.12 KG/M2 | TEMPERATURE: 97.5 F | SYSTOLIC BLOOD PRESSURE: 140 MMHG | HEART RATE: 71 BPM | DIASTOLIC BLOOD PRESSURE: 72 MMHG | OXYGEN SATURATION: 100 %

## 2018-03-16 DIAGNOSIS — M54.50 ACUTE BILATERAL LOW BACK PAIN WITHOUT SCIATICA: ICD-10-CM

## 2018-03-16 DIAGNOSIS — M54.2 NECK PAIN: Primary | ICD-10-CM

## 2018-03-16 RX ORDER — TIZANIDINE 2 MG/1
2 TABLET ORAL
Qty: 30 TAB | Refills: 0 | Status: SHIPPED | OUTPATIENT
Start: 2018-03-16 | End: 2018-06-25

## 2018-03-16 RX ORDER — MELOXICAM 7.5 MG/1
7.5 TABLET ORAL
Qty: 30 TAB | Refills: 0 | Status: SHIPPED | OUTPATIENT
Start: 2018-03-16 | End: 2018-06-25
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
## 2018-03-19 ENCOUNTER — HOSPITAL ENCOUNTER (OUTPATIENT)
Dept: PHYSICAL THERAPY | Age: 83
Discharge: HOME | End: 2018-03-19
Payer: MEDICARE

## 2018-03-19 PROCEDURE — 97162 PT EVAL MOD COMPLEX 30 MIN: CPT

## 2018-03-19 PROCEDURE — G8978 MOBILITY CURRENT STATUS: HCPCS

## 2018-03-19 PROCEDURE — G8979 MOBILITY GOAL STATUS: HCPCS

## 2018-03-19 PROCEDURE — 97535 SELF CARE MNGMENT TRAINING: CPT
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
## 2018-03-19 NOTE — PROGRESS NOTES
PHYSICAL THERAPY - DAILY TREATMENT NOTE    Patient Name: Nandini Valencia        Date: 3/19/2018  : 1929   yes Patient  Verified  Visit #:     Insurance: Payor: Juan Juarez / Plan: [address] [place] [address] HMO / Product Type: Managed Care Medicare /      In time: 11:26 Out time: 12:05   Total Treatment Time: 39     Medicare Time Tracking (below)   Total Timed Codes (min):  15 1:1 Treatment Time:  39     TREATMENT AREA =  Back pain [M54.9]    SUBJECTIVE  Pain Level (on 0 to 10 scale):  8   10   Medication Changes/New allergies or changes in medical history, any new surgeries or procedures?    no  If yes, update Summary List   Subjective Functional Status/Changes:  []  No changes reported     See initial eval     Pt late to PT eval. Eval scheduled to start at 11:00 am, but did not start until 11:26am due to waiting on pt to complete paperwork. Pt requested her daughter come back with her for PT eval.     OBJECTIVE    15 min Patient Education:  yes  Reviewed HEP   []  Progressed/Changed HEP based on:  Pt instructed to amb with rollator at all times to improve safety and decrease risk of falls. Pt instructed on HEP to be completed daily and instructed to STOP if an increase in pain occurs or pain/symptoms go into leg. Pt educated on red flags and to seek immediate medical attention/911 if those occur. Pt educated on correct body mechanics with supine to sit transfers to protect spine. Reviewed POC and goals. Information reviewed with pt and pt's daughter who both report understanding.      Other Objective/Functional Measures:    See initial eval   Post Treatment Pain Level (on 0 to 10) scale:    10     ASSESSMENT  Assessment/Changes in Function:     See initial eval     []  See Progress Note/Recertification   Patient will continue to benefit from skilled PT services to see initial eval   Progress toward goals / Updated goals:    Pt denied sharp pains or red flags with initial eval or therapeutic ex. Pt reported an improvement in pain/sxs at end of session. See initial eval.     PLAN  [x]  Upgrade activities as tolerated yes Continue plan of care   []  Discharge due to :    [x]  Other: PT 1-2x/week for 4-6 weeks. Pt to schedule additional eval for neck.      Therapist: Glen Evangelista PT    Date: 3/19/2018 Time: 2:46 PM     Future Appointments  Date Time Provider Angel Dutton   3/20/2018 1:30 PM [facility]   3/22/2018 8:00 AM Glen Evangelista PT [facility]   3/26/2018 11:00 AM Birdie Blake PTA [facility]   4/2/2018 11:00 AM Kathie Ambrosio [facility]   4/9/2018 11:00 AM Torres Rogers PTA [facility]   4/16/2018 11:30 AM Glen Evangelista PT [facility]   4/23/2018 11:00 AM Birdie Blake PTA [facility]   5/11/2018 11:30 AM Lissy Benjamin MD [facility]

## 2018-03-20 ENCOUNTER — TELEPHONE (OUTPATIENT)
Dept: FAMILY MEDICINE CLINIC | Age: 83
End: 2018-03-20

## 2018-03-20 ENCOUNTER — APPOINTMENT (OUTPATIENT)
Dept: PHYSICAL THERAPY | Age: 83
End: 2018-03-20
Payer: MEDICARE

## 2018-03-20 NOTE — TELEPHONE ENCOUNTER
Pts daughter requesting for an MRI for the back and shoulders  Roxanna Martinez 6 hours ago (10:53 AM)                 Pt's daughter, Ángel [facility]) called in regards to the pt.  She states that they took her to the PT and they are requiring an MRI of the back and shoulders to be ordered by Dr Marie Coelho since they did not have any xrays or anything to determine what kind of exercises they need to do for her.      She is requesting to go to [facility].      Please advise.                   Documentation

## 2018-03-20 NOTE — TELEPHONE ENCOUNTER
Pt's daughter, Sarah Pang [facility][1]) called in regards to the pt. She states that they took her to the PT and they are requiring an MRI of the back and shoulders to be ordered by Dr Karla Singh since they did not have any xrays or anything to determine what kind of exercises they need to do for her. She is requesting to go to [facility]. Please advise.

## 2018-03-21 NOTE — TELEPHONE ENCOUNTER
Dr Emmett Hardy spoke to the pts daughter.  Daughter will bring the xray report from [facility] tomorrow morning

## 2018-03-22 ENCOUNTER — APPOINTMENT (OUTPATIENT)
Dept: PHYSICAL THERAPY | Age: 83
End: 2018-03-22
Payer: MEDICARE

## 2018-03-26 ENCOUNTER — DOCUMENTATION ONLY (OUTPATIENT)
Dept: FAMILY MEDICINE CLINIC | Age: 83
End: 2018-03-26

## 2018-03-26 DIAGNOSIS — S32.020A CLOSED COMPRESSION FRACTURE OF SECOND LUMBAR VERTEBRA, INITIAL ENCOUNTER: ICD-10-CM

## 2018-03-26 DIAGNOSIS — M54.50 ACUTE BILATERAL LOW BACK PAIN WITHOUT SCIATICA: ICD-10-CM

## 2018-03-26 DIAGNOSIS — M54.2 NECK PAIN: Primary | ICD-10-CM

## 2018-03-26 NOTE — PROGRESS NOTES
I discussed the xrays result from urgent care today with pt, she does have compression fx of L2, so MRI is ordered, and referral to Dr Carley Hayes placed, will hold on on PT for now until pt is seen by Dr Rehan Landa were ok  C spine xrays showed DJD  changes

## 2018-04-02 ENCOUNTER — APPOINTMENT (OUTPATIENT)
Dept: PHYSICAL THERAPY | Age: 83
End: 2018-04-02

## 2018-04-09 ENCOUNTER — APPOINTMENT (OUTPATIENT)
Dept: PHYSICAL THERAPY | Age: 83
End: 2018-04-09

## 2018-04-16 ENCOUNTER — APPOINTMENT (OUTPATIENT)
Dept: PHYSICAL THERAPY | Age: 83
End: 2018-04-16

## 2018-04-23 ENCOUNTER — APPOINTMENT (OUTPATIENT)
Dept: PHYSICAL THERAPY | Age: 83
End: 2018-04-23

## 2018-04-23 DIAGNOSIS — E11.49 TYPE II DIABETES MELLITUS WITH NEUROLOGICAL MANIFESTATIONS (HCC): ICD-10-CM

## 2018-04-23 NOTE — TELEPHONE ENCOUNTER
Patient's daughter requested a refill for test strips. She is also requesting a phone call from the nurse.

## 2018-05-14 DIAGNOSIS — E11.49 TYPE II DIABETES MELLITUS WITH NEUROLOGICAL MANIFESTATIONS (HCC): ICD-10-CM

## 2018-05-14 NOTE — TELEPHONE ENCOUNTER
Linda Ortiz ([address] [place] [address]) called on behalf of the pt requesting for more test strips because she is running out.   She states that the rx may need to be upped because she's checking her sugars a lot more than usual due to her being nervous

## 2018-05-15 NOTE — TELEPHONE ENCOUNTER
Please advise pt that there is no need to check more than 3 times daily, if she run out early then she might have to pay for the strips    Also she had recent appt for 30 minutes and did not show up !!!, she need to reschedule AND show up please

## 2018-05-15 NOTE — TELEPHONE ENCOUNTER
Spoke with pt's daughter, Popeye Goldstein in regards to refill request for test strips. Two pt identifier's and permission to release verified. Pt's daughter states she has found her test strips and does not need a refill at this time. Pt's daughter is advised of the PCP's message and having lab performed in 1/2018. Pt's daughter states she is agreeable to scheduling an appointment.      Pt is scheduled for Tuesday, June 05, 2018 11:15 AM.

## 2018-05-29 DIAGNOSIS — G62.9 NEUROPATHY: ICD-10-CM

## 2018-06-01 NOTE — TELEPHONE ENCOUNTER
Gabapentin 100 mg caps    Last Visit 03/16/18  Last Filled 05/18/17  Quantity 180   Refills 3  Pharmacy confirmed.   Future Appointments  Date Time Provider Angel Marija   6/5/2018 11:15 AM Alina Rizzo MD [facility]

## 2018-06-04 RX ORDER — GABAPENTIN 100 MG/1
CAPSULE ORAL
Qty: 180 CAP | Refills: 0 | Status: SHIPPED | OUTPATIENT
Start: 2018-06-04

## 2018-06-25 ENCOUNTER — OFFICE VISIT (OUTPATIENT)
Dept: FAMILY MEDICINE CLINIC | Age: 83
End: 2018-06-25

## 2018-06-25 VITALS
HEIGHT: 63 IN | BODY MASS INDEX: 27.82 KG/M2 | RESPIRATION RATE: 24 BRPM | TEMPERATURE: 98.1 F | SYSTOLIC BLOOD PRESSURE: 130 MMHG | DIASTOLIC BLOOD PRESSURE: 70 MMHG | HEART RATE: 61 BPM | OXYGEN SATURATION: 99 % | WEIGHT: 157 LBS

## 2018-06-25 DIAGNOSIS — I10 ESSENTIAL HYPERTENSION: ICD-10-CM

## 2018-06-25 DIAGNOSIS — E11.49 TYPE II DIABETES MELLITUS WITH NEUROLOGICAL MANIFESTATIONS (HCC): Primary | ICD-10-CM

## 2018-06-25 DIAGNOSIS — E78.2 MIXED HYPERLIPIDEMIA: ICD-10-CM

## 2018-06-25 LAB
GLUCOSE POC: 183 MG/DL
HBA1C MFR BLD HPLC: 8.6 %

## 2018-06-25 NOTE — PROGRESS NOTES
HISTORY OF PRESENT ILLNESS  Benigno Pettit is a 80 y.o. female. HPI  DM, with neuropathy, worsening, her a1c 8.6 today, up from 7.5 5 months ago, glucose varies at home, about 150-160 in am, 200-300 during the day, she has not been very compliant with diet, her neuropathy is controlled on neurontin as needed  HTN, stable, bp is well controlled on meds daily  HLD, stable on pravachol daily  Review of Systems   Constitutional: Negative for fever. Respiratory: Negative for shortness of breath. Cardiovascular: Negative for chest pain, palpitations and leg swelling. Gastrointestinal: Negative for nausea. Musculoskeletal: Negative for falls and myalgias. Neurological: Negative for dizziness and headaches. Physical Exam   Constitutional: She is oriented to person, place, and time. She appears well-developed and well-nourished. Cardiovascular: Normal rate, regular rhythm and normal heart sounds. Pulmonary/Chest: Effort normal and breath sounds normal.   Abdominal: Soft. There is no tenderness. Neurological: She is alert and oriented to person, place, and time. Skin:   Feet: slightly decreased sensation, no rash, no calluses, good pulses   Vitals reviewed. ASSESSMENT and PLAN  Diagnoses and all orders for this visit:    1. Type II diabetes mellitus with neurological manifestations ([address] worsening, increase Lantus to 22 units, add januvia, monitor glucose TID, rtc 4 weeks, and bring readings, discussed diet compliance  -     AMB POC HEMOGLOBIN A1C  -     AMB POC GLUCOSE BLOOD, BY GLUCOSE MONITORING DEVICE  -     MICROALBUMIN, UR, RAND W/ MICROALB/CREAT RATIO; Future  -     METABOLIC PANEL, COMPREHENSIVE; Future  -     LIPID PANEL; Future  -     SITagliptin (JANUVIA) 100 mg tablet; Take 1 Tab by mouth every morning. 2. Essential hypertension, stable  -     METABOLIC PANEL, COMPREHENSIVE; Future  -     LIPID PANEL; Future    3.  Mixed hyperlipidemia, stable  -     METABOLIC PANEL, COMPREHENSIVE; Future  -     LIPID PANEL; Future    Lab Results   Component Value Date/Time    Cholesterol, total 165 05/18/2017 11:27 AM    HDL Cholesterol 46 05/18/2017 11:27 AM    LDL, calculated 99 05/18/2017 11:27 AM    VLDL, calculated 20 05/18/2017 11:27 AM    Triglyceride 99 05/18/2017 11:27 AM    CHOL/HDL Ratio 4.4 01/12/2010 09:14 AM     Lab Results   Component Value Date/Time    Sodium 140 10/16/2017 11:33 AM    Potassium 3.8 10/16/2017 11:33 AM    Chloride 96 10/16/2017 11:33 AM    CO2 28 10/16/2017 11:33 AM    Anion gap 11 09/09/2010 10:12 AM    Glucose 91 10/16/2017 11:33 AM    BUN 22 10/16/2017 11:33 AM    Creatinine 0.94 10/16/2017 11:33 AM    BUN/Creatinine ratio 23 10/16/2017 11:33 AM    GFR est AA 63 10/16/2017 11:33 AM    GFR est non-AA 54 (L) 10/16/2017 11:33 AM    Calcium 9.8 10/16/2017 11:33 AM    Bilirubin, total 0.4 10/16/2017 11:33 AM    AST (SGOT) 23 10/16/2017 11:33 AM    Alk.  phosphatase 64 10/16/2017 11:33 AM    Protein, total 7.3 10/16/2017 11:33 AM    Albumin 4.1 10/16/2017 11:33 AM    Globulin 3.6 01/12/2010 09:14 AM    A-G Ratio 1.3 10/16/2017 11:33 AM    ALT (SGPT) 15 10/16/2017 11:33 AM

## 2018-06-25 NOTE — PROGRESS NOTES
Luke Carmen is a 80 y.o. female (: 1929) presenting to address:    Chief Complaint   Patient presents with    Diabetes       Vitals:    18 1406   BP: 154/79   Pulse: 61   Resp: 24   Temp: 98.1 °F (36.7 °C)   TempSrc: Oral   SpO2: 99%   Weight: 157 lb (71.2 kg)   Height: 5' 3\" (1.6 m)   PainSc:   0 - No pain       Hearing/Vision:   No exam data present    Learning Assessment:     Learning Assessment 2014   PRIMARY LEARNER Patient   PRIMARY LANGUAGE ENGLISH   LEARNER PREFERENCE PRIMARY OTHER (COMMENT)   ANSWERED BY patient   RELATIONSHIP SELF     Depression Screening:     PHQ over the last two weeks 2018   Little interest or pleasure in doing things Not at all   Feeling down, depressed or hopeless Not at all   Total Score PHQ 2 0     Fall Risk Assessment:     Fall Risk Assessment, last 12 mths 2018   Able to walk? Yes   Fall in past 12 months? No   Fall with injury? -   Number of falls in past 12 months -   Fall Risk Score -     Abuse Screening:     Abuse Screening Questionnaire 3/16/2018   Do you ever feel afraid of your partner? N   Are you in a relationship with someone who physically or mentally threatens you? N   Is it safe for you to go home? Y     Coordination of Care Questionaire:   1. Have you been to the ER, urgent care clinic since your last visit? Hospitalized since your last visit? NO    2. Have you seen or consulted any other health care providers outside of the [address] [place] [address] since your last visit? Include any pap smears or colon screening. NO    Advanced Directive:   1. Do you have an Advanced Directive? NO    2. Would you like information on Advanced Directives?  NO

## 2018-06-29 DIAGNOSIS — E11.49 TYPE II DIABETES MELLITUS WITH NEUROLOGICAL MANIFESTATIONS (HCC): ICD-10-CM

## 2018-06-29 RX ORDER — LANCETS
EACH MISCELLANEOUS
Qty: 100 EACH | Refills: 11 | Status: SHIPPED | OUTPATIENT
Start: 2018-06-29
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
## 2018-07-16 DIAGNOSIS — E11.49 TYPE II DIABETES MELLITUS WITH NEUROLOGICAL MANIFESTATIONS (HCC): ICD-10-CM

## 2018-07-17 DIAGNOSIS — I10 ESSENTIAL HYPERTENSION: ICD-10-CM

## 2018-07-17 DIAGNOSIS — E11.49 TYPE II DIABETES MELLITUS WITH NEUROLOGICAL MANIFESTATIONS (HCC): ICD-10-CM

## 2018-07-17 RX ORDER — METFORMIN HYDROCHLORIDE 500 MG/1
TABLET, EXTENDED RELEASE ORAL
Qty: 360 TAB | Refills: 0 | Status: SHIPPED | OUTPATIENT
Start: 2018-07-17 | End: 2018-09-06 | Stop reason: SDUPTHER

## 2018-07-17 RX ORDER — LOSARTAN POTASSIUM 100 MG/1
TABLET ORAL
Qty: 90 TAB | Refills: 1 | Status: SHIPPED | OUTPATIENT
Start: 2018-07-17 | End: 2018-11-20 | Stop reason: SDUPTHER

## 2018-09-06 DIAGNOSIS — E11.49 TYPE II DIABETES MELLITUS WITH NEUROLOGICAL MANIFESTATIONS (HCC): ICD-10-CM

## 2018-09-08 RX ORDER — METFORMIN HYDROCHLORIDE 500 MG/1
TABLET, EXTENDED RELEASE ORAL
Qty: 360 TAB | Refills: 0 | Status: SHIPPED | OUTPATIENT
Start: 2018-09-08 | End: 2019-03-11 | Stop reason: SDUPTHER

## 2018-09-25 ENCOUNTER — HOSPITAL ENCOUNTER (OUTPATIENT)
Dept: LAB | Age: 83
Discharge: HOME | End: 2018-09-25

## 2018-09-25 ENCOUNTER — OFFICE VISIT (OUTPATIENT)
Dept: FAMILY MEDICINE CLINIC | Age: 83
End: 2018-09-25

## 2018-09-25 ENCOUNTER — HOSPITAL ENCOUNTER (OUTPATIENT)
Dept: LAB | Age: 83
Discharge: HOME | End: 2018-09-25
Payer: MEDICARE

## 2018-09-25 VITALS
SYSTOLIC BLOOD PRESSURE: 150 MMHG | BODY MASS INDEX: 27.64 KG/M2 | OXYGEN SATURATION: 98 % | WEIGHT: 156 LBS | TEMPERATURE: 97.5 F | RESPIRATION RATE: 14 BRPM | DIASTOLIC BLOOD PRESSURE: 70 MMHG | HEIGHT: 63 IN | HEART RATE: 60 BPM

## 2018-09-25 DIAGNOSIS — E11.49 TYPE II DIABETES MELLITUS WITH NEUROLOGICAL MANIFESTATIONS (HCC): ICD-10-CM

## 2018-09-25 DIAGNOSIS — I10 ESSENTIAL HYPERTENSION: ICD-10-CM

## 2018-09-25 DIAGNOSIS — R30.0 DYSURIA: ICD-10-CM

## 2018-09-25 DIAGNOSIS — R30.0 DYSURIA: Primary | ICD-10-CM

## 2018-09-25 DIAGNOSIS — E78.2 MIXED HYPERLIPIDEMIA: ICD-10-CM

## 2018-09-25 LAB
BILIRUB UR QL STRIP: NEGATIVE
GLUCOSE UR-MCNC: NEGATIVE MG/DL
HBA1C MFR BLD HPLC: 7.6 %
KETONES P FAST UR STRIP-MCNC: NEGATIVE MG/DL
PH UR STRIP: 6 [PH] (ref 4.6–8)
PROT UR QL STRIP: NEGATIVE
SP GR UR STRIP: 1.01 (ref 1–1.03)
UA UROBILINOGEN AMB POC: NORMAL (ref 0.2–1)
URINALYSIS CLARITY POC: CLEAR
URINALYSIS COLOR POC: YELLOW
URINE BLOOD POC: NORMAL
URINE LEUKOCYTES POC: NORMAL
URINE NITRITES POC: POSITIVE

## 2018-09-25 PROCEDURE — 87077 CULTURE AEROBIC IDENTIFY: CPT | Performed by: INTERNAL MEDICINE

## 2018-09-25 PROCEDURE — 82043 UR ALBUMIN QUANTITATIVE: CPT | Performed by: INTERNAL MEDICINE

## 2018-09-25 PROCEDURE — 99001 SPECIMEN HANDLING PT-LAB: CPT | Performed by: INTERNAL MEDICINE

## 2018-09-25 PROCEDURE — 87086 URINE CULTURE/COLONY COUNT: CPT | Performed by: INTERNAL MEDICINE

## 2018-09-25 PROCEDURE — 87186 SC STD MICRODIL/AGAR DIL: CPT | Performed by: INTERNAL MEDICINE

## 2018-09-25 RX ORDER — PIOGLITAZONEHYDROCHLORIDE 45 MG/1
TABLET ORAL
Qty: 90 TAB | Refills: 1 | Status: SHIPPED | OUTPATIENT
Start: 2018-09-25

## 2018-09-25 RX ORDER — PRAVASTATIN SODIUM 40 MG/1
40 TABLET ORAL
Qty: 90 TAB | Refills: 1 | Status: SHIPPED | OUTPATIENT
Start: 2018-09-25

## 2018-09-25 RX ORDER — CIPROFLOXACIN 250 MG/1
250 TABLET, FILM COATED ORAL EVERY 12 HOURS
Qty: 14 TAB | Refills: 0 | Status: SHIPPED | OUTPATIENT
Start: 2018-09-25 | End: 2018-10-02

## 2018-09-25 NOTE — PROGRESS NOTES
Moon Major is a 80 y.o. female here for urinary symptoms. Patient DECLINED flu vaccine. Moon Major is a 80 y.o. female (: 1929) presenting to address:    Chief Complaint   Patient presents with    Urinary Frequency     Sore       Vitals:    18 1435   BP: 186/76   Pulse: 60   Resp: 14   Temp: 97.5 °F (36.4 °C)   TempSrc: Oral   SpO2: 98%   Weight: 156 lb (70.8 kg)   Height: 5' 3\" (1.6 m)   PainSc:   0 - No pain       Hearing/Vision:   No exam data present    Learning Assessment:     Learning Assessment 2014   PRIMARY LEARNER Patient   PRIMARY LANGUAGE ENGLISH   LEARNER PREFERENCE PRIMARY OTHER (COMMENT)   ANSWERED BY patient   RELATIONSHIP SELF     Depression Screening:     PHQ over the last two weeks 2018   Little interest or pleasure in doing things Not at all   Feeling down, depressed, irritable, or hopeless Not at all   Total Score PHQ 2 0     Fall Risk Assessment:     Fall Risk Assessment, last 12 mths 2018   Able to walk? Yes   Fall in past 12 months? No   Fall with injury? -   Number of falls in past 12 months -   Fall Risk Score -     Abuse Screening:     Abuse Screening Questionnaire 3/16/2018   Do you ever feel afraid of your partner? N   Are you in a relationship with someone who physically or mentally threatens you? N   Is it safe for you to go home? Y     Coordination of Care Questionaire:   1. Have you been to the ER, urgent care clinic since your last visit? Hospitalized since your last visit? NO    2. Have you seen or consulted any other health care providers outside of the [address] [place] [address] since your last visit? Include any pap smears or colon screening. NO    Advanced Directive:   1. Do you have an Advanced Directive? NO    2. Would you like information on Advanced Directives?  NO

## 2018-09-25 NOTE — MR AVS SNAPSHOT
[address] [place] [address]  [address] [facility] [id number] [phone] Patient: Mckayla Wise MRN: [id number] Visit Information Date & Time Provider Department Dept. Phone Encounter #  
 9/25/2018  2:30 PM Elyse Reed MD [facility] [phone] Follow-up Instructions Return in about 2 weeks (around 10/9/2018). Upcoming Health Maintenance Date Due Shingrix Vaccine Age 50> (1 of 2) 6/12/1979 Pneumococcal 65+ Low/Medium Risk (2 of 2 - PPSV23) 10/4/2017 MEDICARE YEARLY EXAM 3/14/2018 MICROALBUMIN Q1 5/18/2018 LIPID PANEL Q1 5/18/2018 Influenza Age 5 to Adult 8/1/2018 HEMOGLOBIN A1C Q6M 12/25/2018 EYE EXAM RETINAL OR DILATED Q1 1/10/2019 FOOT EXAM Q1 6/25/2019 GLAUCOMA SCREENING Q2Y 1/10/2020 DTaP/Tdap/Td series (2 - Td) 7/6/2025 Allergies as of 9/25/2018  Review Complete On: 9/25/2018 By: Elyse Reed MD  
  
 Severity Noted Reaction Type Reactions Ace Inhibitors  01/09/2013    Angioedema Current Immunizations  Reviewed on 10/4/2016 Name Date Influenza High Dose Vaccine PF 10/4/2016 11:59 AM  
 Influenza Vaccine 12/17/2014, 11/22/2013 Pneumococcal Conjugate (PCV-13) 10/4/2016 Tdap 7/6/2015 Zoster Vaccine, Live 7/10/2015 Not reviewed this visit You Were Diagnosed With   
  
 Codes Comments Dysuria    -  Primary ICD-10-CM: R30.0 ICD-9-CM: 645. 1 Type II diabetes mellitus with neurological manifestations ([facility] [address]     ICD-10-CM: E11.49 
ICD-9-CM: 250.60 Mixed hyperlipidemia     ICD-10-CM: E78.2 ICD-9-CM: 272.2 Essential hypertension     ICD-10-CM: I10 
ICD-9-CM: 401.9 Vitals BP Pulse Temp Resp Height(growth percentile) Weight(growth percentile) 150/70 60 97.5 °F (36.4 °C) (Oral) 14 5' 3\" (1.6 m) 156 lb (70.8 kg) LMP SpO2 BMI OB Status Smoking Status (LMP Unknown) 98% 27.63 kg/m2 Hysterectomy Never Smoker Vitals History BMI and BSA Data Body Mass Index Body Surface Area  
 27.63 kg/m 2 1.77 m 2 Preferred Pharmacy Pharmacy Name Phone [address], [address] [phone] Your Updated Medication List  
  
   
This list is accurate as of 9/25/18  3:16 PM.  Always use your most recent med list.  
  
  
  
  
 * ACCU-CHEK SOFTCLIX LANCETS Misc Generic drug:  Lancets USE ONE LANCET TWICE DAILY TO TEST BLOOD SUGAR * Lancets Misc Commonly known as:  ACCU-CHEK SOFTCLIX LANCETS Check BS 3 daily Dx. E11.49  
  
 * ACCU-CHEK SOFTCLIX LANCETS Misc Generic drug:  Lancets USE ONE  TO CHECK GLUCOSE THREE TIMES DAILY * Blood-Glucose Meter monitoring kit One Touch Ultra 2 meter, Dx , on insulin, monitor glucose TID * Blood-Glucose Meter Misc Commonly known as:  ACCU-CHEK GUILLAUME PLUS METER  
1 Each by Does Not Apply route two (2) times a day. Check BS TID, Dx   
  
 ciprofloxacin HCl 250 mg tablet Commonly known as:  CIPRO Take 1 Tab by mouth every twelve (12) hours for 7 days. ergocalciferol 50,000 unit capsule Commonly known as:  ERGOCALCIFEROL Take 1 Cap by mouth every seven (7) days. fluticasone 50 mcg/actuation nasal spray Commonly known as:  Philippe Blizzard 2 Sprays by Both Nostrils route daily. gabapentin 100 mg capsule Commonly known as:  NEURONTIN  
TAKE TWO CAPSULES BY MOUTH THREE TIMES DAILY AS NEEDED FOR NEUROPATHIC PAIN  
  
 GAS RELIEF 125 mg capsule Generic drug:  simethicone Take 125 mg by mouth four (4) times daily as needed. hydroCHLOROthiazide 25 mg tablet Commonly known as:  HYDRODIURIL Take 1 Tab by mouth daily. * insulin glargine 100 unit/mL (3 mL) Inpn Commonly known as:  Rosa Contreras Inject 18 units beneath the skin everyday at bedtime. Indications: TYPE 1 DIABETES MELLITUS * insulin glargine 100 unit/mL (3 mL) Inpn Commonly known as:  LANTUS SOLOSTAR U-100 INSULIN INJECT 18 UNITS SUBCUTANEOUSLY ONCE DAILY AT BEDTIME . EACH  PEN  EXPIRES  28  DAYS  AFTER  FIRST  USE. Insulin Needles (Disposable) 32 gauge x 1/4\" Ndle 1 Each by Does Not Apply route daily. Use with Lantus solostar insulin pen once daily  
  
 loratadine 10 mg Cap Take  by mouth.  
  
 losartan 100 mg tablet Commonly known as:  COZAAR  
TAKE ONE TABLET BY MOUTH ONCE DAILY  
  
 metFORMIN  mg tablet Commonly known as:  GLUCOPHAGE XR  
TAKE 2 TABLETS BY MOUTH TWICE DAILY WITH MEALS  
  
 * ONETOUCH ULTRA TEST strip Generic drug:  glucose blood VI test strips USE ONE STRIP TO CHECK GLUCOSE THREE TIMES DAILY * glucose blood VI test strips strip Commonly known as:  blood glucose test  
One Touch Ultra 2 test strips, monitor glucose TID, Dx , on insulin * glucose blood VI test strips strip Commonly known as:  ACCU-CHEK GUILLAUME PLUS TEST STRP Check BS 3 times daily Dx. E11.49  
  
 * OTHER  
BD single use swab, monitor glucose TID, Dx E11.49, pt is on insulin * OTHER One touch Delica Lancet 63P (monitor glucose BID) Dx E11.49, pt is on insulin  
  
 pantoprazole 40 mg tablet Commonly known as:  PROTONIX  
TAKE ONE TABLET BY MOUTH ONCE DAILY pioglitazone 45 mg tablet Commonly known as:  ACTOS  
TAKE ONE TABLET BY MOUTH EVERY DAY  
  
 pravastatin 40 mg tablet Commonly known as:  PRAVACHOL Take 1 Tab by mouth nightly. SITagliptin 100 mg tablet Commonly known as:  Vallery Peña Take 1 Tab by mouth every morning. * Notice: This list has 12 medication(s) that are the same as other medications prescribed for you. Read the directions carefully, and ask your doctor or other care provider to review them with you. Prescriptions Sent to Pharmacy Refills SITagliptin (JANUVIA) 100 mg tablet 1 Sig: Take 1 Tab by mouth every morning. Class: Normal  
 Pharmacy: [address], [address] [facility] [address] Ph #: [phone] Route: Oral  
 pravastatin (PRAVACHOL) 40 mg tablet 1 Sig: Take 1 Tab by mouth nightly. Class: Normal  
 Pharmacy: [address], [address] Ph #: [phone] Route: Oral  
 pioglitazone (ACTOS) 45 mg tablet 1 Sig: TAKE ONE TABLET BY MOUTH EVERY DAY Class: Normal  
 Pharmacy: [address], [address] [facility] [address] Ph #: [phone]  
 ciprofloxacin HCl (CIPRO) 250 mg tablet 0 Sig: Take 1 Tab by mouth every twelve (12) hours for 7 days. Class: Normal  
 Pharmacy: [address], [facility] [address] Ph #: [phone] Route: Oral  
  
We Performed the Following AMB POC HEMOGLOBIN A1C [06175 CPT(R)] AMB POC URINALYSIS DIP STICK AUTO W/O MICRO [21715 CPT(R)] Follow-up Instructions Return in about 2 weeks (around 10/9/2018). Introducing [facility]! Brittni Garcia introduces [employer] patient portal. Now you can access parts of your medical record, email your doctor's office, and request medication refills online. 1. In your internet browser, go to [email or web address] 2. Click on the First Time User? Click Here link in the Sign In box. You will see the New Member Sign Up page. 3. Enter your [employer] Access Code exactly as it appears below. You will not need to use this code after youve completed the sign-up process. If you do not sign up before the expiration date, you must request a new code. · [employer] Access Code: [email or web address] Expires: 12/24/2018  3:16 PM 
 
4. Enter the last four digits of your Social Security Number (xxxx) and Date of Birth (mm/dd/yyyy) as indicated and click Submit. You will be taken to the next sign-up page. 5. Create a [employer] ID.  This will be your MyChart login ID and cannot be changed, so think of one that is secure and easy to remember. 6. Create a [employer] password. You can change your password at any time. 7. Enter your Password Reset Question and Answer. This can be used at a later time if you forget your password. 8. Enter your e-mail address. You will receive e-mail notification when new information is available in [address] 9. Click Sign Up. You can now view and download portions of your medical record. 10. Click the Download Summary menu link to download a portable copy of your medical information. If you have questions, please visit the Frequently Asked Questions section of the [employer] website. Remember, [employer] is NOT to be used for urgent needs. For medical emergencies, dial 911. Now available from your iPhone and Android! Please provide this summary of care documentation to your next provider. Your primary care clinician is listed as Magno Pryor. If you have any questions after today's visit, please call [phone].

## 2018-09-25 NOTE — PROGRESS NOTES
HISTORY OF PRESENT ILLNESS  Felisha Mata is a 80 y.o. female. HPI  C/o dysuria and urinary frequency for few days, no hematuria, no fever or chills, no flank pain  DM with neuropathy, improving, her a1c is down to 7.6 today, her fasting glucose is 140-150, but she ran out of actos , need refills, her neuropathy is controlled on neurontin     HTN, stable, bp is controlled on meds dialy  HLD, stable on pravachol,  She did not come back for fasting labs last visit, will do labs today  Review of Systems   Constitutional: Negative for fever. Respiratory: Negative for shortness of breath. Cardiovascular: Negative for chest pain, palpitations and leg swelling. Gastrointestinal: Negative for nausea and vomiting. Genitourinary: Negative for flank pain. Musculoskeletal: Negative for falls and myalgias. Neurological: Negative for dizziness and headaches. Physical Exam   Constitutional: She is oriented to person, place, and time. Cardiovascular: Normal rate, regular rhythm and normal heart sounds. No murmur heard. Pulmonary/Chest: Effort normal and breath sounds normal. She has no rales. Abdominal: Soft. There is tenderness in the suprapubic area. There is no CVA tenderness. Musculoskeletal: She exhibits no edema. Neurological: She is alert and oriented to person, place, and time. Vitals reviewed. ASSESSMENT and PLAN  Diagnoses and all orders for this visit:    1. Dysuria, positive UTI  -     AMB POC URINALYSIS DIP STICK AUTO W/O MICRO  -     CULTURE, URINE; Future  -     ciprofloxacin HCl (CIPRO) 250 mg tablet; Take 1 Tab by mouth every twelve (12) hours for 7 days. 2. Type II diabetes mellitus with neurological manifestations ([facility] improving  -     AMB POC HEMOGLOBIN A1C  -     SITagliptin (JANUVIA) 100 mg tablet; Take 1 Tab by mouth every morning.  -     pioglitazone (ACTOS) 45 mg tablet; TAKE ONE TABLET BY MOUTH EVERY DAY    3.  Mixed hyperlipidemia, stable  -     pravastatin (PRAVACHOL) 40 mg tablet; Take 1 Tab by mouth nightly. 4. Essential hypertension, stable    Labs today  rtc 2 wks    Lab Results   Component Value Date/Time    Hemoglobin A1c 7.7 (H) 10/04/2016 12:20 PM    Hemoglobin A1c (POC) 7.6 09/25/2018 03:03 PM    Hemoglobin A1c, External 7.5 01/11/2018     Lab Results   Component Value Date/Time    Cholesterol, total 165 05/18/2017 11:27 AM    HDL Cholesterol 46 05/18/2017 11:27 AM    LDL, calculated 99 05/18/2017 11:27 AM    VLDL, calculated 20 05/18/2017 11:27 AM    Triglyceride 99 05/18/2017 11:27 AM    CHOL/HDL Ratio 4.4 01/12/2010 09:14 AM     Lab Results   Component Value Date/Time    Sodium 140 10/16/2017 11:33 AM    Potassium 3.8 10/16/2017 11:33 AM    Chloride 96 10/16/2017 11:33 AM    CO2 28 10/16/2017 11:33 AM    Anion gap 11 09/09/2010 10:12 AM    Glucose 91 10/16/2017 11:33 AM    BUN 22 10/16/2017 11:33 AM    Creatinine 0.94 10/16/2017 11:33 AM    BUN/Creatinine ratio 23 10/16/2017 11:33 AM    GFR est AA 63 10/16/2017 11:33 AM    GFR est non-AA 54 (L) 10/16/2017 11:33 AM    Calcium 9.8 10/16/2017 11:33 AM    Bilirubin, total 0.4 10/16/2017 11:33 AM    AST (SGOT) 23 10/16/2017 11:33 AM    Alk.  phosphatase 64 10/16/2017 11:33 AM    Protein, total 7.3 10/16/2017 11:33 AM    Albumin 4.1 10/16/2017 11:33 AM    Globulin 3.6 01/12/2010 09:14 AM    A-G Ratio 1.3 10/16/2017 11:33 AM    ALT (SGPT) 15 10/16/2017 11:33 AM     Results for orders placed or performed in visit on 09/25/18   AMB POC URINALYSIS DIP STICK AUTO W/O MICRO   Result Value Ref Range    Color (UA POC) Yellow     Clarity (UA POC) Clear     Glucose (UA POC) Negative Negative    Bilirubin (UA POC) Negative Negative    Ketones (UA POC) Negative Negative    Specific gravity (UA POC) 1.015 1.001 - 1.035    Blood (UA POC) Trace Negative    pH (UA POC) 6.0 4.6 - 8.0    Protein (UA POC) Negative Negative    Urobilinogen (UA POC) 1 mg/dL 0.2 - 1    Nitrites (UA POC) Positive Negative    Leukocyte esterase (UA POC) Trace Negative   AMB POC HEMOGLOBIN A1C   Result Value Ref Range    Hemoglobin A1c (POC) 7.6 %

## 2018-09-26 LAB
ALBUMIN SERPL-MCNC: NORMAL G/DL
ALP SERPL-CCNC: NORMAL U/L
ALT SERPL-CCNC: NORMAL U/L
AST SERPL-CCNC: NORMAL U/L
BILIRUB SERPL-MCNC: NORMAL MG/DL
BUN SERPL-MCNC: NORMAL MG/DL
CALCIUM SERPL-MCNC: NORMAL MG/DL
CHLORIDE SERPL-SCNC: NORMAL MMOL/L
CHOLEST SERPL-MCNC: NORMAL MG/DL
CO2 SERPL-SCNC: NORMAL MMOL/L
CREAT SERPL-MCNC: NORMAL MG/DL
CREAT UR-MCNC: 77.23 MG/DL (ref 30–125)
CREAT UR-MCNC: NORMAL MG/DL
GLUCOSE SERPL-MCNC: NORMAL MG/DL
HDLC SERPL-MCNC: NORMAL MG/DL
INTERPRETATION, 910389: NORMAL
MICROALBUMIN UR-MCNC: 0.7 MG/DL (ref 0–3)
MICROALBUMIN UR-MCNC: NORMAL
MICROALBUMIN/CREAT UR-RTO: 9 MG/G (ref 0–30)
PDF IMAGE, 910387: NORMAL
POTASSIUM SERPL-SCNC: NORMAL MMOL/L
PROT SERPL-MCNC: NORMAL G/DL
SODIUM SERPL-SCNC: NORMAL MMOL/L
TRIGL SERPL-MCNC: NORMAL MG/DL (ref ?–150)

## 2018-09-27 LAB
BACTERIA SPEC CULT: ABNORMAL
SERVICE CMNT-IMP: ABNORMAL

## 2018-11-07 LAB — INR, EXTERNAL: 3.2

## 2018-11-09 ENCOUNTER — OFFICE VISIT (OUTPATIENT)
Dept: FAMILY MEDICINE CLINIC | Age: 83
End: 2018-11-09

## 2018-11-09 VITALS
TEMPERATURE: 96.8 F | BODY MASS INDEX: 27.46 KG/M2 | SYSTOLIC BLOOD PRESSURE: 170 MMHG | WEIGHT: 155 LBS | RESPIRATION RATE: 18 BRPM | OXYGEN SATURATION: 95 % | DIASTOLIC BLOOD PRESSURE: 80 MMHG | HEART RATE: 58 BPM | HEIGHT: 63 IN

## 2018-11-09 DIAGNOSIS — I10 ESSENTIAL HYPERTENSION: ICD-10-CM

## 2018-11-09 DIAGNOSIS — E78.2 MIXED HYPERLIPIDEMIA: ICD-10-CM

## 2018-11-09 DIAGNOSIS — E11.49 TYPE II DIABETES MELLITUS WITH NEUROLOGICAL MANIFESTATIONS (HCC): ICD-10-CM

## 2018-11-09 DIAGNOSIS — R30.0 DYSURIA: Primary | ICD-10-CM

## 2018-11-09 LAB — INR, EXTERNAL: NORMAL

## 2018-11-09 RX ORDER — HYDROCHLOROTHIAZIDE 25 MG/1
25 TABLET ORAL DAILY
Qty: 90 TAB | Refills: 1 | Status: SHIPPED | OUTPATIENT
Start: 2018-11-09

## 2018-11-09 NOTE — PROGRESS NOTES
Yulissa Nj is a 80 y.o. female (: 1929) presenting to address: Chief Complaint Patient presents with  Dysuria Vitals:  
 18 5498 Pulse: (!) 58 Resp: 18 Temp: 96.8 °F (36 °C) TempSrc: Oral  
SpO2: 95% Weight: 155 lb (70.3 kg) Height: 5' 3\" (1.6 m) PainSc:   0 - No pain Hearing/Vision: No exam data present Learning Assessment:  
 
Learning Assessment 2014 PRIMARY LEARNER Patient PRIMARY LANGUAGE ENGLISH  
LEARNER PREFERENCE PRIMARY OTHER (COMMENT) ANSWERED BY patient RELATIONSHIP SELF Depression Screening: PHQ over the last two weeks 2018 Little interest or pleasure in doing things Not at all Feeling down, depressed, irritable, or hopeless Not at all Total Score PHQ 2 0 Fall Risk Assessment:  
 
Fall Risk Assessment, last 12 mths 2018 Able to walk? Yes Fall in past 12 months? No  
Fall with injury? -  
Number of falls in past 12 months - Fall Risk Score -  
 
Abuse Screening:  
 
Abuse Screening Questionnaire 3/16/2018 Do you ever feel afraid of your partner? Angelika Rodríguez Are you in a relationship with someone who physically or mentally threatens you? Angelika Rodríguez Is it safe for you to go home? Brenda Rubio Coordination of Care Questionaire: 1. Have you been to the ER, urgent care clinic since your last visit? Hospitalized since your last visit? NO 
 
2. Have you seen or consulted any other health care providers outside of the [address] [place] [address] since your last visit? Include any pap smears or colon screening. NO Advanced Directive: 1. Do you have an Advanced Directive? NO 
 
2. Would you like information on Advanced Directives?  NO

## 2018-11-09 NOTE — PROGRESS NOTES
HISTORY OF PRESENT ILLNESS Harshil Sears is a 80 y.o. female. HPI Dysuria, 2/2 UTI, improved, we gave her cipro last visit, she feels better HTN, not controlled, her bp is elevated today, she ran out of HCTZ !, she did not take her cozaar today yet HLD, stable on pravachol, her labs were cancelled last visit due to not enough sample !, will repeat today DM, with neuropathy, stable, last a1c 7.6, glucose is around 170-180 after meals, her neuropathy is controlled on neurontin Review of Systems Constitutional: Negative for chills and fever. Respiratory: Negative for shortness of breath. Cardiovascular: Negative for chest pain, palpitations and leg swelling. Gastrointestinal: Negative for nausea. Genitourinary: Negative for dysuria, flank pain and hematuria. Musculoskeletal: Negative for falls and myalgias. Neurological: Negative for headaches. Physical Exam  
Constitutional: She is oriented to person, place, and time. No distress. Cardiovascular: Normal rate, regular rhythm and normal heart sounds. No murmur heard. Pulmonary/Chest: Effort normal and breath sounds normal. She has no rales. Abdominal: Soft. Bowel sounds are normal. There is no hepatosplenomegaly. There is no tenderness. There is no CVA tenderness. Musculoskeletal: She exhibits no edema. Neurological: She is alert and oriented to person, place, and time. Vitals reviewed. ASSESSMENT and PLAN Diagnoses and all orders for this visit: 1. Dysuria, improved -     URINALYSIS W/ RFLX MICROSCOPIC; Future 2. Essential hypertension, not controlled, will restart HCTZ, continue cozaar 
-     hydroCHLOROthiazide (HYDRODIURIL) 25 mg tablet; Take 1 Tab by mouth daily. 
-     LIPID PANEL; Future -     CBC W/O DIFF; Future -     METABOLIC PANEL, COMPREHENSIVE; Future -     URINALYSIS W/ RFLX MICROSCOPIC; Future 3. Mixed hyperlipidemia, stable -     LIPID PANEL; Future -     CBC W/O DIFF; Future -     METABOLIC PANEL, COMPREHENSIVE; Future 4. Type II diabetes mellitus with neurological manifestations ([address] controlled -     LIPID PANEL; Future -     CBC W/O DIFF; Future -     METABOLIC PANEL, COMPREHENSIVE; Future -     MICROALBUMIN, UR, RAND W/ MICROALB/CREAT RATIO; Future Lab Results Component Value Date/Time Cholesterol, total CANCELED 09/25/2018 12:00 AM  
 HDL Cholesterol CANCELED 09/25/2018 12:00 AM  
 LDL, calculated 99 05/18/2017 11:27 AM  
 VLDL, calculated 20 05/18/2017 11:27 AM  
 Triglyceride CANCELED 09/25/2018 12:00 AM  
 CHOL/HDL Ratio 4.4 01/12/2010 09:14 AM  
 
Lab Results Component Value Date/Time Sodium CANCELED 09/25/2018 12:00 AM  
 Potassium CANCELED 09/25/2018 12:00 AM  
 Chloride CANCELED 09/25/2018 12:00 AM  
 CO2 CANCELED 09/25/2018 12:00 AM  
 Anion gap 11 09/09/2010 10:12 AM  
 Glucose CANCELED 09/25/2018 12:00 AM  
 BUN CANCELED 09/25/2018 12:00 AM  
 Creatinine CANCELED 09/25/2018 12:00 AM  
 BUN/Creatinine ratio 23 10/16/2017 11:33 AM  
 GFR est AA 63 10/16/2017 11:33 AM  
 GFR est non-AA 54 (L) 10/16/2017 11:33 AM  
 Calcium CANCELED 09/25/2018 12:00 AM  
 Bilirubin, total CANCELED 09/25/2018 12:00 AM  
 AST (SGOT) CANCELED 09/25/2018 12:00 AM  
 Alk. phosphatase CANCELED 09/25/2018 12:00 AM  
 Protein, total CANCELED 09/25/2018 12:00 AM  
 Albumin CANCELED 09/25/2018 12:00 AM  
 Globulin 3.6 01/12/2010 09:14 AM  
 A-G Ratio 1.3 10/16/2017 11:33 AM  
 ALT (SGPT) CANCELED 09/25/2018 12:00 AM  
rtc 4 weeks

## 2018-11-20 DIAGNOSIS — I10 ESSENTIAL HYPERTENSION: ICD-10-CM

## 2018-11-21 RX ORDER — LOSARTAN POTASSIUM 100 MG/1
TABLET ORAL
Qty: 90 TAB | Refills: 1 | Status: SHIPPED | OUTPATIENT
Start: 2018-11-21 | End: 2019-05-11 | Stop reason: SDUPTHER

## 2019-02-08 DIAGNOSIS — E11.49 TYPE II DIABETES MELLITUS WITH NEUROLOGICAL MANIFESTATIONS (HCC): ICD-10-CM

## 2019-02-08 RX ORDER — INSULIN GLARGINE 100 [IU]/ML
INJECTION, SOLUTION SUBCUTANEOUS
Qty: 15 PEN | Refills: 0 | Status: SHIPPED | OUTPATIENT
Start: 2019-02-08

## 2019-02-12 ENCOUNTER — TELEPHONE (OUTPATIENT)
Dept: FAMILY MEDICINE CLINIC | Age: 84
End: 2019-02-12

## 2019-02-12 DIAGNOSIS — E11.49 TYPE II DIABETES MELLITUS WITH NEUROLOGICAL MANIFESTATIONS (HCC): ICD-10-CM

## 2019-02-12 NOTE — TELEPHONE ENCOUNTER
Patient is requesting refill ACCU-CHEK GUILLAUME PLUS TEST STRIPS    Last Filled: 04/25/18  Qty: 200  Refills: 5     Last Visit: 11/09/18  No future appointments.     Pharmacy Confirmed

## 2019-02-13 NOTE — TELEPHONE ENCOUNTER
Future Appointments   Date Time Provider Angel Dutton   3/15/2019 10:30 AM Marley Draper MD [facility] [place]

## 2019-02-19 DIAGNOSIS — E11.49 TYPE II DIABETES MELLITUS WITH NEUROLOGICAL MANIFESTATIONS (HCC): ICD-10-CM

## 2019-02-19 NOTE — TELEPHONE ENCOUNTER
Patient's granddaughter is also requesting a prescription for Zenpet. Requested Prescriptions     Pending Prescriptions Disp Refills    lancets (ACCU-CHEK SOFTCLIX LANCETS) misc 100 Each 11     Sig: Check BS 3 daily Dx. E11.49     Patient calling to request refill on:      Remaining pills:  Last appt:11/9/2018  Next appt: Future Appointments   Date Time Provider Angel Dutton   3/15/2019 10:30 AM MD Toshia Kim 51:  [facility]     Patient aware of 72 hour time frame.

## 2019-02-21 RX ORDER — LANCETS
EACH MISCELLANEOUS
Qty: 100 EACH | Refills: 11 | Status: SHIPPED | OUTPATIENT
Start: 2019-02-21

## 2019-03-11 DIAGNOSIS — E11.49 TYPE II DIABETES MELLITUS WITH NEUROLOGICAL MANIFESTATIONS (HCC): ICD-10-CM

## 2019-03-11 RX ORDER — METFORMIN HYDROCHLORIDE 500 MG/1
TABLET, EXTENDED RELEASE ORAL
Qty: 360 TAB | Refills: 0 | Status: SHIPPED | OUTPATIENT
Start: 2019-03-11

## 2019-03-11 NOTE — TELEPHONE ENCOUNTER
Patient is requesting refills:  Metformin  mg     Last Filled:09/08/2018  Qty:360  Refills: 0    Last Visit:11/09/2018  No future appointments.

## 2019-05-11 DIAGNOSIS — I10 ESSENTIAL HYPERTENSION: ICD-10-CM

## 2019-05-11 RX ORDER — LOSARTAN POTASSIUM 50 MG/1
TABLET ORAL
Qty: 60 TAB | Refills: 0 | Status: SHIPPED | OUTPATIENT
Start: 2019-05-11

## 2023-01-13 NOTE — PROGRESS NOTES
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